# Patient Record
Sex: FEMALE | Employment: FULL TIME | URBAN - METROPOLITAN AREA
[De-identification: names, ages, dates, MRNs, and addresses within clinical notes are randomized per-mention and may not be internally consistent; named-entity substitution may affect disease eponyms.]

---

## 2017-01-18 ENCOUNTER — OFFICE VISIT (OUTPATIENT)
Dept: BEHAVIORAL HEALTH | Facility: PHYSICIAN GROUP | Age: 22
End: 2017-01-18

## 2017-01-18 VITALS
HEART RATE: 90 BPM | BODY MASS INDEX: 24.55 KG/M2 | HEIGHT: 61 IN | WEIGHT: 130 LBS | RESPIRATION RATE: 14 BRPM | TEMPERATURE: 98.2 F | SYSTOLIC BLOOD PRESSURE: 122 MMHG | DIASTOLIC BLOOD PRESSURE: 72 MMHG

## 2017-01-18 DIAGNOSIS — F90.0 ADHD (ATTENTION DEFICIT HYPERACTIVITY DISORDER), INATTENTIVE TYPE: ICD-10-CM

## 2017-01-18 DIAGNOSIS — F41.1 GENERALIZED ANXIETY DISORDER: ICD-10-CM

## 2017-01-18 PROCEDURE — 99204 OFFICE O/P NEW MOD 45 MIN: CPT | Performed by: PSYCHIATRY & NEUROLOGY

## 2017-01-18 RX ORDER — DEXTROAMPHETAMINE SACCHARATE, AMPHETAMINE ASPARTATE, DEXTROAMPHETAMINE SULFATE AND AMPHETAMINE SULFATE 5; 5; 5; 5 MG/1; MG/1; MG/1; MG/1
20 TABLET ORAL 2 TIMES DAILY
Qty: 60 TAB | Refills: 0 | Status: SHIPPED | OUTPATIENT
Start: 2017-01-18 | End: 2017-02-22 | Stop reason: SDUPTHER

## 2017-01-18 NOTE — PROGRESS NOTES
BEHAVIORAL HEALTH  INITIAL PSYCHIATRIC EVALUATION    Name: Honey Woodall  MRN: [id number]  : 1995  Age: 21 y.o.  Date of assessment: 2017  PCP: Pcp Pt States None  Persons in attendance:Patient  Total face-to-face time: 45 minutes.    CHIEF COMPLAINT AND HISTORY OF PRESENTING PROBLEM:   (As stated by Patient)  Honey Woodall is a 21 y.o., Unknown female referred for assessment by No ref. provider found. Primary presenting issue includes   Chief Complaint   Patient presents with   • Other     The patient is seen today for evaluation and treatment of ADHD.   .    HISTORY OF PRESENT ILLNESS:  This is a 20 yo female, full time student at [employer], working, living with her room mates, has a boyfriend. The patient is major in biology and wants to got Physical therapy.    The patient had psych testing done at [facility] and was diagnosed ADHD and anxiety disorder. The patient had been taking adderall 20 mg twice a day since may of last year. The patient reported that she has been getting her prescription through the student center. The patient grew up is hyper child and she had trouble focusing in class. The patient had trouble finishing her homework. The patient was restless and had trouble stay focused. The patient had also trouble waiting for her turn. The patient also reported that she is worrier all the time and she had trouble getting in to sleep. The patient had few anxiety attacks. The patient had an injury to her finger and they corrected it by surgery. The patient reported infrequent dreams about deer chasing her. The patient denied symptoms of ilsa, hypomania, depression, suicidal thoughts, and psychosis.      FAMILY/SOCIAL HISTORY:  Does patient/parent report a family history of behavioral health issues, diagnoses, or treatment? No  History reviewed. No pertinent family history.   The patients both parent work in real estate. They are . The patient has a brother  and a sister. The patient was born and raised in [place].  She had a good childhood. The patient grew up as a hyper child. The patient had an average grade and finished high school. The patient is in [employer] fourth year student. The patient is working and has a boyfriend.      Current living situation/household members: Lives with friends.  Relevant family history/structure/dynamics: supportive parents.  Quality/quantity of current family and/or social support: lives with room mates.   Social History     Social History   • Marital Status: Unknown     Spouse Name: N/A   • Number of Children: N/A   • Years of Education: N/A     Occupational History   • Not on file.     Social History Main Topics   • Smoking status: Never Smoker    • Smokeless tobacco: Not on file   • Alcohol Use: Yes      Comment: occ   • Drug Use: No   • Sexual Activity: Not on file     Other Topics Concern   • Not on file     Social History Narrative       PSYCHIATRIC TREATMENT HISTORY:  Does patient/parent report a history of outpatient psychiatric treatment for patient?   No:     Does patient/parent report a history of psychiatric hospitalizations for patient?  No:    Does patient/parent report a history of psychotherapy or other behavioral health treatment for patient?   No:    PAST MEDICAL HISTORY:         Past Medical History   Diagnosis Date   • Dizziness    • ADD (attention deficit disorder)    • Anxiety        Medication Allergies  Review of patient's allergies indicates no known allergies.    Medications (non psychiatric)  Current Outpatient Prescriptions   Medication Sig Dispense Refill   • amphetamine-dextroamphetamine (ADDERALL) 20 MG Tab Take 1 Tab by mouth 2 times a day. 60 Tab 0   • hydrocodone-acetaminophen (NORCO) 5-325 MG TABS per tablet Take 1-2 Tabs by mouth every four hours as needed. 20 Tab 0     No current facility-administered medications for this visit.       DEVELOPMENTAL HISTORY:  Delivery:not asked.  Birth  weight: not asked.  Prenatal complications:  No   complications:  No   complications:  No  In utero substance exposure:  No    Reached developmental milestones within normal limits?   Gross motor:  Yes  Fine motor:  Yes   Speech:  Yes   Social interaction:  Yes    EDUCATIONAL:  Is patient currently enrolled in a school/educational program?   Yes:   Current grade level/year: fourth year college.  School:  [employer].  Typical grades/performance:  Average.  Does the patient/parent identify impact of presenting issue on school functioning?  yes - ADHD.  Special Education services/IEP/504 Plan past or current? no  Other relevant school functioning:  NA.      Psychiatric Review of Systems:   Mood: Variable mood  Anxiety: Frequent worry  Psychomotor/Energy: Increased impulsivity  Cognitive: Impaired concentration - chronic  Behavior: Low/Decreased goal-directed activity   Social: Fear of rejection       Other symptoms: none.    LEGAL HISTORY:  Has the patient ever been involved with juvenile, adult, or family legal systems? No    Does patient report ever committing a crime? No   Does patient report every being arrested? No  Does patient report ever being a victim of a crime? No  Does patient report involvement in any current legal issues?No  Does patient report any current agency (parole/probation/CPS/) involvement? No    ABUSE/NEGLECT SCREENING:  Does patient report feeling “unsafe” in his/her home, or afraid of anyone? No  Does patient report any history of physical, sexual, or emotional abuse? No  Does parent or significant other report any of the above? No  Is there evidence of neglect by self?No  Is there evidence of neglect by a caregiver? No  Does the patient/parent report any history of CPS/APS/police involvement related to suspected abuse/neglect or domestic violence? No    Based on the information provided during the current assessment, is a mandated report of suspected abuse/neglect  "being made?   No    SAFETY ASSESSMENT - SELF:  Does patient acknowledge current or past symptoms of dangerousness to self? No    Does parent/significant other report patient has current or past symptoms of dangerousness to self? No      History of suicide by family member: No  History of suicide by friend/significant other: No    Recent change in amount/specificity/intensity of suicidal thoughts or self-harm behavior?No  Current access to firearms, medications, or other identified means of suicide/self-harm? No  If yes, willing to restrict access to means of suicide/self-harm? No  Protective factors present: Fear of suicide    Current Suicide Risk: Low  Safety Plan completed, documented in chart, and copy given to patient: No     Crisis Safety Plan completed and copy given to patient: No     SAFETY ASSESSMENT - OTHERS:  Does patient acknowledge current or past symptoms of aggressive behavior or risk to others? No  Does parent/significant other report patient has current or past symptoms of aggressive behavior or risk to others? No      Recent change in amount/specificity/intensity of thoughts or threats to harm others? No  Current access to firearms/other identified means of harm? No  If yes, willing to restrict access to weapons/means of harm? No    Current Homicide Risk: Low  Crisis safety Plan completed, documented in chart, and copy given to patient? No    Based on information provided during the current assessment, is a mandated \"duty to warn\" being exercised? No    SUBSTANCE USE/ADDICTION HISTORY:  [] Not applicable - patient 10 years of age or younger    Is there a family history of substance use/addiction? No  Does patient acknowledge or parent/significant other report use of/dependence on substances? No  Last time patient used alcohol: NA  Within the past week? No  Last time patient used marijuana: NA  Within the past month? No  Any other street drugs ever tried even once? No  Any use of prescription " "medications/pills without a prescription, or for reasons others than originally prescribed?  No  Any other addictive behavior reported (gambling, shopping, sex)? No    Drug History:  Amphetamine:Denied       Cannibis:denied       Cocaine:denied      Ecstasy:denied      Hallucinogen:denied      Inhalant: denied       Opiate:denied      Other:      Sedative: denied.        What consequences does the patient associate with any of the above substance use and or addictive behaviors?   None    Patient’s motivation/readiness for change: yes.    [] Patient denies use of any substance/addictive behaviors    STRENGTHS/ASSETS:  Strengths Identified by interviewer: Insight into problems, Self-awareness, Social support, Optimism and Cognitive flexibility  Strengths Identified by patient: willing to take medication.    MENTAL STATUS EXAM/OBSERVATIONS  /72 mmHg  Pulse 90  Temp(Src) 36.8 °C (98.2 °F)  Resp 14  Ht 1.549 m (5' 0.98\")  Wt 58.968 kg (130 lb)  BMI 24.58 kg/m2  Participation: Active verbal participation, Attentive and Engaged  Grooming:  Casual  Orientation: Alert and Fully Oriented   Eye contact:  Good  Behavior: Calm   Mood:  Anxious  Affect: Flexible and Full range  Thought process: Logical and Goal-directed  Thought content: Within normal limits  Speech: Rate within normal limits and Volume within normal limits  Perception: Within normal limits  Memory:  No gross evidence of memory deficits  Insight:  Good  Judgment: Good  Other:   Family/couple interaction observations: NA.    RESULTS OF SCREENING MEASURES:  [] Not applicable  Measure:   Score:     Measure:   Score:        CLINICAL FORMULATION:   This is a 20 yo female, full time student, lives with her roommates, employed, seen today for evaluation. The patient was evaluated at student health center at [facility] and was diagnosed ADHD and has been stable on Adderall 20 mg twice a day.      DIAGNOSTIC IMPRESSION(S):  1. ADHD (attention deficit " hyperactivity disorder), inattentive type    2. Generalized anxiety disorder         IDENTIFIED NEEDS/PLAN:  [If any of these marked, trigger DISPOSITION list]  Mood/anxiety and ADHD. The patient is started on adderall 20 mg twice a day and prescription for one moth and patient will call for refills in a month. The patient agreed to follow up in two months.  Defer    Does patient express agreement with the above plan? Yes    Referral appointment(s) scheduled? No    [x] More than 50% of face-to-face time was spent in counseling and coordinating care  Discussed:   The patient needs follow up every two months and refills medication every month and patient will call.    Steven Huerta M.D.

## 2017-02-22 RX ORDER — DEXTROAMPHETAMINE SACCHARATE, AMPHETAMINE ASPARTATE, DEXTROAMPHETAMINE SULFATE AND AMPHETAMINE SULFATE 5; 5; 5; 5 MG/1; MG/1; MG/1; MG/1
20 TABLET ORAL 2 TIMES DAILY
Qty: 60 TAB | Refills: 0 | Status: SHIPPED | OUTPATIENT
Start: 2017-02-22

## 2017-03-16 ENCOUNTER — OFFICE VISIT (OUTPATIENT)
Dept: BEHAVIORAL HEALTH | Facility: PHYSICIAN GROUP | Age: 22
End: 2017-03-16
Payer: COMMERCIAL

## 2017-03-16 VITALS
HEIGHT: 61 IN | DIASTOLIC BLOOD PRESSURE: 74 MMHG | TEMPERATURE: 98.1 F | BODY MASS INDEX: 24.55 KG/M2 | WEIGHT: 130 LBS | HEART RATE: 88 BPM | SYSTOLIC BLOOD PRESSURE: 124 MMHG | RESPIRATION RATE: 16 BRPM

## 2017-03-16 DIAGNOSIS — F90.0 ATTENTION DEFICIT HYPERACTIVITY DISORDER (ADHD), PREDOMINANTLY INATTENTIVE TYPE: ICD-10-CM

## 2017-03-16 PROCEDURE — 99213 OFFICE O/P EST LOW 20 MIN: CPT | Performed by: PSYCHIATRY & NEUROLOGY

## 2017-03-16 RX ORDER — DEXTROAMPHETAMINE SACCHARATE, AMPHETAMINE ASPARTATE, DEXTROAMPHETAMINE SULFATE AND AMPHETAMINE SULFATE 5; 5; 5; 5 MG/1; MG/1; MG/1; MG/1
20 TABLET ORAL 2 TIMES DAILY
Qty: 60 TAB | Refills: 0 | Status: SHIPPED | OUTPATIENT
Start: 2017-03-16

## 2017-03-16 NOTE — PROGRESS NOTES
"[facility]  PSYCHIATRIC FOLLOW-UP NOTE    Name: Honey Woodall  MRN: [id number]  : 1995  Age: 21 y.o.  Date of assessment: 3/16/2017  PCP: Nelson Pt States None  Persons in attendance: Patient    REASON FOR VISIT/CHIEF COMPLAINT (as stated by Patient):  Honey Woodall is a 21 y.o., Unknown female, attending follow-up appointment for   Chief Complaint   Patient presents with   • Other     The patient is seen today for follow up on her ADHD and she is doing well on her medication.   .      HISTORY OF PRESENT ILLNESS:    This is a 20 yo female, full time student, seen today for ADHD. The patient has been doing well on her medication. The patients anxiety and insomnia  Are unchanged. The patient is able to focus and able to finish her home work.     PSYCHOSOCIAL CHANGES SINCE PREVIOUS CONTACT:  Symptoms of ADHD improved.    RESPONSE TO TREATMENT:  The patients is doing well.     MEDICATION SIDE EFFECTS:  Denied.    REVIEW OF SYSTEMS:        Constitutional negative   Eyes negative   Ears/Nose/Mouth/Throat negative   Cardiovascular negative   Respiratory negative   Gastrointestinal negative   Genitourinary negative   Muscular negative   Integumentary negative   Neurological negative   Endocrine negative   Hematologic/Lymphatic negative       PSYCHIATRIC EXAMINATION/MENTAL STATUS  /74 mmHg  Pulse 88  Temp(Src) 36.7 °C (98.1 °F)  Resp 16  Ht 1.549 m (5' 0.98\")  Wt 58.968 kg (130 lb)  BMI 24.58 kg/m2  Participation: Active verbal participation, Attentive and Engaged  Grooming:Casual  Orientation: Alert and Fully Oriented  Eye contact: Good  Behavior:Calm   Mood: happy.  Affect: Flexible and Full range  Thought process: Logical and Goal-directed  Thought content:  Within normal limits  Speech: Rate within normal limits and Volume within normal limits  Perception:  Within normal limits  Memory:  No gross evidence of memory deficits  Insight: Good  Judgment: Good  Family/couple interaction " observations:   Other:    Current risk:    Suicide: Low   Homicide: Low   Self-harm: Low  Relapse: Low  Other:   Crisis Safety Plan reviewed?Yes  If evidence of imminent risk is present, intervention/plan:    Medical Records/Labs/Diagnostic Tests Reviewed: yes.    Medical Records/Labs/Diagnostic Tests Ordered: none.    DIAGNOSTIC IMPRESSION(S):  1. Attention deficit hyperactivity disorder (ADHD), predominantly inattentive type           ASSESSMENT AND PLAN:  ADHD    Continue adderall 20 mg one twice a day # 60 NR fill this after 03/ 22/17.   Follow up in three months  Patient will call for refills.     More than 50% of face-to-face time in this 30 minute visit was spent in psychotherapy/counseling.    Topics addressed include:  Attention  Good grades.    Steven Huerta M.D.

## 2017-04-27 RX ORDER — DEXTROAMPHETAMINE SACCHARATE, AMPHETAMINE ASPARTATE, DEXTROAMPHETAMINE SULFATE AND AMPHETAMINE SULFATE 5; 5; 5; 5 MG/1; MG/1; MG/1; MG/1
20 TABLET ORAL 2 TIMES DAILY
Qty: 60 TAB | Refills: 0 | Status: SHIPPED | OUTPATIENT
Start: 2017-04-27 | End: 2017-04-27 | Stop reason: SDUPTHER

## 2017-04-27 RX ORDER — DEXTROAMPHETAMINE SACCHARATE, AMPHETAMINE ASPARTATE, DEXTROAMPHETAMINE SULFATE AND AMPHETAMINE SULFATE 5; 5; 5; 5 MG/1; MG/1; MG/1; MG/1
20 TABLET ORAL 2 TIMES DAILY
Qty: 60 TAB | Refills: 0 | Status: SHIPPED | OUTPATIENT
Start: 2017-04-27

## 2017-05-16 ENCOUNTER — OFFICE VISIT (OUTPATIENT)
Dept: BEHAVIORAL HEALTH | Facility: PHYSICIAN GROUP | Age: 22
End: 2017-05-16

## 2017-05-16 VITALS
HEIGHT: 61 IN | DIASTOLIC BLOOD PRESSURE: 72 MMHG | SYSTOLIC BLOOD PRESSURE: 122 MMHG | WEIGHT: 130 LBS | TEMPERATURE: 98.2 F | BODY MASS INDEX: 24.55 KG/M2 | RESPIRATION RATE: 16 BRPM | HEART RATE: 86 BPM

## 2017-05-16 DIAGNOSIS — F90.2 ATTENTION DEFICIT HYPERACTIVITY DISORDER (ADHD), COMBINED TYPE: ICD-10-CM

## 2017-05-16 PROCEDURE — 99213 OFFICE O/P EST LOW 20 MIN: CPT | Performed by: PSYCHIATRY & NEUROLOGY

## 2017-05-16 RX ORDER — DEXTROAMPHETAMINE SACCHARATE, AMPHETAMINE ASPARTATE, DEXTROAMPHETAMINE SULFATE AND AMPHETAMINE SULFATE 5; 5; 5; 5 MG/1; MG/1; MG/1; MG/1
20 TABLET ORAL 2 TIMES DAILY
Qty: 60 TAB | Refills: 0 | Status: SHIPPED | OUTPATIENT
Start: 2017-05-16

## 2017-05-16 NOTE — Clinical Note
[facility]     May 16, 2017    Patient: Honey Woodall   YOB: 1995   Date of Visit: 5/16/2017       To Whom It May Concern:    Honey Woodall was seen and treated in our department on 5/16/2017. She was unable to complete her fall semester in 2015 because of medical reason. I recommend medical withdrawal for fall semester in 2015.    Sincerely,     Steven Huerta M.D.

## 2017-05-16 NOTE — Clinical Note
May 16, 2017        Honey Woodall  YOB: 1995  Date of Visit 5/16/2017      To Whom It May Concern:    Honey Woodall has been treated in our department for anxiety and lack of concentration. She was unable to complete her fall semester in 2015 because of her medical conditions. I recommend medical withdrawal for fall semester in 2015.           Sincerely        Steven Huerta MD

## 2017-05-16 NOTE — PROGRESS NOTES
"[facility]  PSYCHIATRIC FOLLOW-UP NOTE    Name: Honey Woodall  MRN: [id number]  : 1995  Age: 21 y.o.  Date of assessment: 2017  PCP: Nelson Pt States None  Persons in attendance: Patient    REASON FOR VISIT/CHIEF COMPLAINT (as stated by Patient):  Honey Woodall is a 21 y.o., Unknown female, attending follow-up appointment for   Chief Complaint   Patient presents with   • Other     The patient is seen today for follow up on her ADHD and she is doing well on her medication. The patient requested a letter for her school.   .      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 The patient has been taking one adderall every morning and one at noon. The patient reported that she is not sleeping well and she reported dreams at night. The patient was uable to finish  in  and she requested a letter for that.     PSYCHOSOCIAL CHANGES SINCE PREVIOUS CONTACT:  The patients attencion and concentrations improved but not sleep in well.    RESPONSE TO TREATMENT:  Fair.    MEDICATION SIDE EFFECTS:  Denied.    REVIEW OF SYSTEMS:        Constitutional negative   Eyes negative   Ears/Nose/Mouth/Throat negative   Cardiovascular negative   Respiratory negative   Gastrointestinal negative   Genitourinary negative   Muscular negative   Integumentary negative   Neurological negative   Endocrine negative   Hematologic/Lymphatic negative       PSYCHIATRIC EXAMINATION/MENTAL STATUS  /72 mmHg  Pulse 86  Temp(Src) 36.8 °C (98.2 °F)  Resp 16  Ht 1.549 m (5' 0.98\")  Wt 58.968 kg (130 lb)  BMI 24.58 kg/m2  Participation: Active verbal participation, Attentive and Engaged  Grooming:Casual  Orientation: Alert and Fully Oriented  Eye contact: Good  Behavior:Calm   Mood: Anxious  Affect: Anxious  Thought process: Logical and Goal-directed  Thought content:  Within normal limits  Speech: Rate within normal limits and Volume within normal limits  Perception:  " Within normal limits  Memory:  No gross evidence of memory deficits  Insight: Good  Judgment: Good  Family/couple interaction observations:   Other:    Current risk:    Suicide: Low   Homicide: Low   Self-harm: Low  Relapse: Low  Other:   Crisis Safety Plan reviewed?Yes  If evidence of imminent risk is present, intervention/plan:    Medical Records/Labs/Diagnostic Tests Reviewed: yes.    Medical Records/Labs/Diagnostic Tests Ordered: none.    DIAGNOSTIC IMPRESSION(S):  1. Attention deficit hyperactivity disorder (ADHD), combined type           ASSESSMENT AND PLAN:  ADHD.    Decrease   adderall 20 mg one in am and half at noon # 60. Fill this after 06/01/17.  Follow up in three months.  Letter for school    More than 50% of face-to-face time in this 30 minute visit was spent in psychotherapy/counseling.    Topics addressed include:    Steven Huerta M.D.

## 2017-05-16 NOTE — Clinical Note
[facility]     May 16, 2017    Patient: Honey Woodall   YOB: 1995   Date of Visit: 5/16/2017       To Whom It May Concern:    Honey Woodall was seen and treated in our department on 5/16/2017. She was unable to complete her fall semester in 2015 because of medical reason. I recommend medical withdrawal for fall semester in 2015.    Sincerely,           Stveen Huerta M.D.

## 2017-07-11 RX ORDER — DEXTROAMPHETAMINE SACCHARATE, AMPHETAMINE ASPARTATE, DEXTROAMPHETAMINE SULFATE AND AMPHETAMINE SULFATE 5; 5; 5; 5 MG/1; MG/1; MG/1; MG/1
20 TABLET ORAL 2 TIMES DAILY
Qty: 60 TAB | Refills: 0 | Status: SHIPPED | OUTPATIENT
Start: 2017-07-11

## 2017-07-28 ENCOUNTER — OFFICE VISIT (OUTPATIENT)
Dept: BEHAVIORAL HEALTH | Facility: PHYSICIAN GROUP | Age: 22
End: 2017-07-28

## 2017-07-28 VITALS
SYSTOLIC BLOOD PRESSURE: 118 MMHG | TEMPERATURE: 97.7 F | BODY MASS INDEX: 24.35 KG/M2 | RESPIRATION RATE: 14 BRPM | HEART RATE: 80 BPM | HEIGHT: 61 IN | WEIGHT: 129 LBS | DIASTOLIC BLOOD PRESSURE: 70 MMHG

## 2017-07-28 DIAGNOSIS — F90.2 ATTENTION DEFICIT HYPERACTIVITY DISORDER (ADHD), COMBINED TYPE: ICD-10-CM

## 2017-07-28 PROCEDURE — 90833 PSYTX W PT W E/M 30 MIN: CPT | Performed by: PSYCHIATRY & NEUROLOGY

## 2017-07-28 PROCEDURE — 99213 OFFICE O/P EST LOW 20 MIN: CPT | Performed by: PSYCHIATRY & NEUROLOGY

## 2017-07-28 RX ORDER — DEXTROAMPHETAMINE SACCHARATE, AMPHETAMINE ASPARTATE, DEXTROAMPHETAMINE SULFATE AND AMPHETAMINE SULFATE 5; 5; 5; 5 MG/1; MG/1; MG/1; MG/1
20 TABLET ORAL 2 TIMES DAILY
Qty: 60 TAB | Refills: 0 | Status: SHIPPED | OUTPATIENT
Start: 2017-07-28 | End: 2017-08-15 | Stop reason: SDUPTHER

## 2017-07-28 NOTE — PROGRESS NOTES
"[facility]  PSYCHIATRIC FOLLOW-UP NOTE    Name: Honey Woodall  MRN: [id number]  : 1995  Age: 21 y.o.  Date of assessment: 2017  PCP: Nelson Pt States None  Persons in attendance: Patient  REASON FOR VISIT/CHIEF COMPLAINT (as stated by Patient):  Honey Woodall is a 21 y.o., Unknown female, attending follow-up appointment for   Chief Complaint   Patient presents with   • Medication Management     The patient is seen today for follow up. The patient requested refill on her medication.   .      HISTORY OF PRESENT ILLNESS:    This is a 22 yo female, full time student, taking summer classes, seen today for follow up. The patient reported that her anxiety is better and her dreams are not frequent. The patient denied any depression. The patient denied any side effects from medications.     PSYCHOSOCIAL CHANGES SINCE PREVIOUS CONTACT:  The patient is able to focus and able to finish her task on time.    RESPONSE TO TREATMENT:  Good.    MEDICATION SIDE EFFECTS:  Denied.    REVIEW OF SYSTEMS:        Constitutional negative   Eyes negative   Ears/Nose/Mouth/Throat negative   Cardiovascular negative   Respiratory negative   Gastrointestinal negative   Genitourinary negative   Muscular negative   Integumentary negative   Neurological negative   Endocrine negative   Hematologic/Lymphatic negative       PSYCHIATRIC EXAMINATION/MENTAL STATUS  /70 mmHg  Pulse 80  Temp(Src) 36.5 °C (97.7 °F)  Resp 14  Ht 1.549 m (5' 0.98\")  Wt 58.514 kg (129 lb)  BMI 24.39 kg/m2  Participation: Active verbal participation and Attentive  Grooming:Casual  Orientation: Alert and Fully Oriented  Eye contact: Good  Behavior:Calm   Mood: Anxious  Affect: Anxious  Thought process: Logical and Goal-directed  Thought content:  Within normal limits  Speech: Rate within normal limits and Volume within normal limits  Perception:  Within normal limits  Memory:  No gross evidence of memory deficits  Insight: Good  Judgment: " Good  Family/couple interaction observations:   Other:    Current risk:    Suicide: Low   Homicide: Low   Self-harm: Low  Relapse: Low  Other:   Crisis Safety Plan reviewed?Yes  If evidence of imminent risk is present, intervention/plan:    Medical Records/Labs/Diagnostic Tests Reviewed: yes.    Medical Records/Labs/Diagnostic Tests Ordered: none.    DIAGNOSTIC IMPRESSION(S):  1. Attention deficit hyperactivity disorder (ADHD), combined type           ASSESSMENT AND PLAN:    1. ADHD  Adderall 20 mg BID # 60 NR.  NARx checked  The patient signed controlled medication treatment agreement.    2. Follow up in three months.    16 minutes were spent in psychotherapy.  (If greater than 16 minutes, add 75097 code)   Topics addressed in psychotherapy include:  We focused on today how to practice meditation.  The patient agreed to get a tape so she can use it at home.  The patient is doing breathing exercise.  The patient agreed to start writing the list of things to do and check it every day before she goes to sleep.  Encouraged her to exercise every day.   Steven Huerta M.D.

## 2017-08-15 RX ORDER — DEXTROAMPHETAMINE SACCHARATE, AMPHETAMINE ASPARTATE, DEXTROAMPHETAMINE SULFATE AND AMPHETAMINE SULFATE 5; 5; 5; 5 MG/1; MG/1; MG/1; MG/1
20 TABLET ORAL 2 TIMES DAILY
Qty: 60 TAB | Refills: 0 | Status: SHIPPED | OUTPATIENT
Start: 2017-08-15

## 2017-09-27 RX ORDER — DEXTROAMPHETAMINE SACCHARATE, AMPHETAMINE ASPARTATE, DEXTROAMPHETAMINE SULFATE AND AMPHETAMINE SULFATE 5; 5; 5; 5 MG/1; MG/1; MG/1; MG/1
20 TABLET ORAL 2 TIMES DAILY
Qty: 60 TAB | Refills: 0 | Status: SHIPPED | OUTPATIENT
Start: 2017-09-27

## 2017-10-13 ENCOUNTER — OFFICE VISIT (OUTPATIENT)
Dept: BEHAVIORAL HEALTH | Facility: PHYSICIAN GROUP | Age: 22
End: 2017-10-13

## 2017-10-13 VITALS
TEMPERATURE: 98.1 F | BODY MASS INDEX: 24.17 KG/M2 | WEIGHT: 128 LBS | RESPIRATION RATE: 14 BRPM | HEIGHT: 61 IN | DIASTOLIC BLOOD PRESSURE: 74 MMHG | HEART RATE: 82 BPM | SYSTOLIC BLOOD PRESSURE: 120 MMHG

## 2017-10-13 DIAGNOSIS — F90.2 ATTENTION DEFICIT HYPERACTIVITY DISORDER (ADHD), COMBINED TYPE: ICD-10-CM

## 2017-10-13 PROCEDURE — 90833 PSYTX W PT W E/M 30 MIN: CPT | Performed by: PSYCHIATRY & NEUROLOGY

## 2017-10-13 PROCEDURE — 99213 OFFICE O/P EST LOW 20 MIN: CPT | Performed by: PSYCHIATRY & NEUROLOGY

## 2017-10-13 RX ORDER — DEXTROAMPHETAMINE SACCHARATE, AMPHETAMINE ASPARTATE, DEXTROAMPHETAMINE SULFATE AND AMPHETAMINE SULFATE 5; 5; 5; 5 MG/1; MG/1; MG/1; MG/1
20 TABLET ORAL 2 TIMES DAILY
Qty: 60 TAB | Refills: 0 | Status: SHIPPED | OUTPATIENT
Start: 2017-10-13

## 2017-10-13 ASSESSMENT — PATIENT HEALTH QUESTIONNAIRE - PHQ9
9. THOUGHTS THAT YOU WOULD BE BETTER OFF DEAD, OR OF HURTING YOURSELF: 0
SUM OF ALL RESPONSES TO PHQ9 QUESTIONS 1 AND 2: 2
8. MOVING OR SPEAKING SO SLOWLY THAT OTHER PEOPLE COULD HAVE NOTICED. OR THE OPPOSITE, BEING SO FIGETY OR RESTLESS THAT YOU HAVE BEEN MOVING AROUND A LOT MORE THAN USUAL: 0
SUM OF ALL RESPONSES TO PHQ QUESTIONS 1-9: 6
5. POOR APPETITE OR OVEREATING: 1
3. TROUBLE FALLING OR STAYING ASLEEP OR SLEEPING TOO MUCH: 1
6. FEELING BAD ABOUT YOURSELF - OR THAT YOU ARE A FAILURE OR HAVE LET YOURSELF OR YOUR FAMILY DOWN: 1
4. FEELING TIRED OR HAVING LITTLE ENERGY: 1
7. TROUBLE CONCENTRATING ON THINGS, SUCH AS READING THE NEWSPAPER OR WATCHING TELEVISION: 0
2. FEELING DOWN, DEPRESSED, IRRITABLE, OR HOPELESS: 1
1. LITTLE INTEREST OR PLEASURE IN DOING THINGS: 1

## 2017-10-13 NOTE — PROGRESS NOTES
"[facility]  PSYCHIATRIC FOLLOW-UP NOTE    Name: Honey Woodall  MRN: [id number]  : 1995  Age: 22 y.o.  Date of assessment: 10/13/2017  PCP: Pcp Pt States None  Persons in attendance: Patient  REASON FOR VISIT/CHIEF COMPLAINT (as stated by Patient):  Honey Woodall is a 22 y.o., Unknown female, attending follow-up appointment for   Chief Complaint   Patient presents with   • Medication Management     I need refills on adderall and I have been doing well.   .      HISTORY OF PRESENT ILLNESS:    This is a 23 yo female, full time student, seen today for follow up. The patient is taking adderall and she skips it on week ends. The patient is doing well in school and her grades are better. The patient has bene sleeping well and her anxiety improved. The patients appetite unchanged. The patient is graduating in december of this year.    PSYCHOSOCIAL CHANGES SINCE PREVIOUS CONTACT:  She is able to focus and able to finish her task.    RESPONSE TO TREATMENT:  Good.    MEDICATION SIDE EFFECTS:  Denied.    REVIEW OF SYSTEMS:        Constitutional negative   Eyes negative   Ears/Nose/Mouth/Throat negative   Cardiovascular negative   Respiratory negative   Gastrointestinal negative   Genitourinary negative   Muscular negative   Integumentary negative   Neurological negative   Endocrine negative   Hematologic/Lymphatic negative       PSYCHIATRIC EXAMINATION/MENTAL STATUS  /74   Pulse 82   Temp 36.7 °C (98.1 °F)   Resp 14   Ht 1.549 m (5' 0.98\")   Wt 58.1 kg (128 lb)   BMI 24.20 kg/m²   Participation: Active verbal participation, Attentive and Engaged  Grooming:Neat  Orientation: Alert and Fully Oriented  Eye contact: Good  Behavior:Calm   Mood: Euthymic  Affect: Flexible and Full range  Thought process: Logical and Goal-directed  Thought content:  Within normal limits  Speech: Rate within normal limits and Volume within normal limits  Perception:  Within normal limits  Memory:  No gross evidence " of memory deficits  Insight: Good  Judgment: Good  Family/couple interaction observations:   Other:    Current risk:    Suicide: Low   Homicide: Low   Self-harm: Low  Relapse: Low  Other:   Crisis Safety Plan reviewed?Yes  If evidence of imminent risk is present, intervention/plan:    Medical Records/Labs/Diagnostic Tests Reviewed: yes.    Medical Records/Labs/Diagnostic Tests Ordered: none.    DIAGNOSTIC IMPRESSION(S):  1. Attention deficit hyperactivity disorder (ADHD), combined type           ASSESSMENT AND PLAN:    1. ADHD.  Adderall 20 mg one BID # 60 NR.    2. Follow up in three months.  Call for refills    16 minutes were spent in psychotherapy.  (If greater than 16 minutes, add 86520 code)   Topics addressed in psychotherapy include:  The patient is practicing meditation and relaxation every day.  The patient keeping a daily routine and exercise.  Good family support.  Working on her negative automatic thoughts.  Steven Huerta M.D.

## 2017-12-13 ENCOUNTER — APPOINTMENT (OUTPATIENT)
Dept: BEHAVIORAL HEALTH | Facility: PHYSICIAN GROUP | Age: 22
End: 2017-12-13